# Patient Record
Sex: MALE | Race: WHITE | NOT HISPANIC OR LATINO
[De-identification: names, ages, dates, MRNs, and addresses within clinical notes are randomized per-mention and may not be internally consistent; named-entity substitution may affect disease eponyms.]

---

## 2020-10-18 ENCOUNTER — TRANSCRIPTION ENCOUNTER (OUTPATIENT)
Age: 25
End: 2020-10-18

## 2024-02-27 ENCOUNTER — EMERGENCY (EMERGENCY)
Facility: HOSPITAL | Age: 29
LOS: 1 days | Discharge: HOME | End: 2024-02-27
Attending: EMERGENCY MEDICINE
Payer: COMMERCIAL

## 2024-02-27 VITALS
OXYGEN SATURATION: 99 % | HEIGHT: 71 IN | DIASTOLIC BLOOD PRESSURE: 76 MMHG | SYSTOLIC BLOOD PRESSURE: 136 MMHG | HEART RATE: 104 BPM | WEIGHT: 195.11 LBS | TEMPERATURE: 98 F | RESPIRATION RATE: 18 BRPM

## 2024-02-27 PROCEDURE — 99283 EMERGENCY DEPT VISIT LOW MDM: CPT

## 2024-02-27 PROCEDURE — 99282 EMERGENCY DEPT VISIT SF MDM: CPT

## 2024-02-27 NOTE — ED PROVIDER NOTE - PHYSICAL EXAMINATION
General: well appearing, alert, oriented to person, time, place  Psych: mood appropriate  Head: normocephalic; atraumatic  Eyes: conjunctivae clear bilaterally, sclerae anicteric  ENT: no nasal flaring, patent nares  Cardio: non-tachycardic; skin warm and well perfused  Resp: normal respiratory effort; no accessory muscle use  GI: abdomen soft, nontender, nondistended  Neuro: normal sensation, moving all four extremities equally  Skin: No evidence of rash or bruising  MSK: normal movement of all extremities  Lymph/Vasc: no LE edema

## 2024-02-27 NOTE — ED PROVIDER NOTE - OBJECTIVE STATEMENT
28M with no past medical history presents to the emergency department due to flushing of the face and intermittent abdominal cramping associated with loose stools two times a day. He felt nervous and came in as he had heard about carcinoid syndrome and flushing of the face. He denies fevers or chills. He does not have nausea or vomiting.

## 2024-02-27 NOTE — ED PROVIDER NOTE - ATTENDING CONTRIBUTION TO CARE
28-year-old male no significant past medical history presenting to the emergency department with flushed face.  He states that he has noticed it intermittently over the past few days.  He has also noticed that at times he has had diarrhea.  He looked on the [employer] and he was concerned that he had carcinoid syndrome.  He is not having any headaches he is not having any nausea or vomiting he is not having any lightheadedness, no family history of autoimmune disease other than thyroid disease, he is currently asymptomatic.  He is afebrile vitals are stable his physical exam is unremarkable.  I discussed with him the importance of finishing up with outpatient workup.

## 2024-02-27 NOTE — ED PROVIDER NOTE - NSFOLLOWUPINSTRUCTIONS_ED_ALL_ED_FT
You were seen in the emergency department for abdominal pain. We have evaluated you and determined that you do not require further hospital interventions.    Please follow up with a GASTROENTEROLOGIST to discuss the results of your stay in our department. Someone will call you to help schedule an appointment.    You may continue to experience abdominal pain while at home. For your pain, you should take 20mg (1 tablet) of famotidine (brand name Pepcid) once a day. You may also take 1000mg (2 extra strength tablets) of acetaminophen (brand name Tylenol) every 6 hours. Do not take more than four doses in a day. You should avoid taking ibuprofen (brand names Motrin and Advil) as well as eating spicy or fatty foods as these may worsen your pain.    If you start to experience worsening symptoms such as persistent nausea or vomiting, inability to pass stool or gas, severe abdominal pain, bloody stool, please return to the emergency department for further evaluation.

## 2024-02-27 NOTE — ED PROVIDER NOTE - PATIENT PORTAL LINK FT
You can access the FollowMyHealth Patient Portal offered by [facility] by registering at the following website: [email or web address]. By joining [employer] [facility]’s FollowMyHealth portal, you will also be able to view your health information using other applications (apps) compatible with our system.

## 2024-02-27 NOTE — ED PROVIDER NOTE - CLINICAL SUMMARY MEDICAL DECISION MAKING FREE TEXT BOX
28M here with flushing of the face and 2 loose stools per day. Patient has a soft abdomen, is having regular bowel movements, is tolerating PO intake. He does not currently require a CT abdomen and pelvis. He will be referred to gastroenterology for outpatient workup.

## 2024-03-06 PROBLEM — Z78.9 OTHER SPECIFIED HEALTH STATUS: Chronic | Status: ACTIVE | Noted: 2024-02-27

## 2024-03-11 ENCOUNTER — APPOINTMENT (OUTPATIENT)
Dept: CT IMAGING | Facility: CLINIC | Age: 29
End: 2024-03-11
Payer: COMMERCIAL

## 2024-03-11 ENCOUNTER — OUTPATIENT (OUTPATIENT)
Dept: OUTPATIENT SERVICES | Facility: HOSPITAL | Age: 29
LOS: 1 days | End: 2024-03-11
Payer: COMMERCIAL

## 2024-03-11 DIAGNOSIS — R10.9 UNSPECIFIED ABDOMINAL PAIN: ICD-10-CM

## 2024-03-11 PROCEDURE — 74177 CT ABD & PELVIS W/CONTRAST: CPT | Mod: 26

## 2024-03-11 PROCEDURE — 74177 CT ABD & PELVIS W/CONTRAST: CPT
